# Patient Record
Sex: MALE | Race: WHITE | NOT HISPANIC OR LATINO | URBAN - METROPOLITAN AREA
[De-identification: names, ages, dates, MRNs, and addresses within clinical notes are randomized per-mention and may not be internally consistent; named-entity substitution may affect disease eponyms.]

---

## 2019-05-23 ENCOUNTER — EMERGENCY (EMERGENCY)
Facility: HOSPITAL | Age: 34
LOS: 1 days | Discharge: HOME | End: 2019-05-23
Attending: EMERGENCY MEDICINE
Payer: COMMERCIAL

## 2019-05-23 VITALS
HEIGHT: 72 IN | WEIGHT: 199.96 LBS | HEART RATE: 59 BPM | TEMPERATURE: 98 F | OXYGEN SATURATION: 95 % | RESPIRATION RATE: 18 BRPM

## 2019-05-23 LAB
BASOPHILS # BLD AUTO: 0 K/UL — SIGNIFICANT CHANGE UP (ref 0–0.2)
BASOPHILS NFR BLD AUTO: 0.1 % — SIGNIFICANT CHANGE UP (ref 0–2)
EOSINOPHIL # BLD AUTO: 0 K/UL — SIGNIFICANT CHANGE UP (ref 0–0.5)
EOSINOPHIL NFR BLD AUTO: 0.4 % — SIGNIFICANT CHANGE UP (ref 0–5)
HCT VFR BLD CALC: 44.3 % — SIGNIFICANT CHANGE UP (ref 42–52)
HGB BLD-MCNC: 14.8 G/DL — SIGNIFICANT CHANGE UP (ref 14–18)
LYMPHOCYTES # BLD AUTO: 0.8 K/UL — LOW (ref 1–4.8)
LYMPHOCYTES # BLD AUTO: 11.8 % — LOW (ref 20–55)
MCHC RBC-ENTMCNC: 27.7 PG — SIGNIFICANT CHANGE UP (ref 27–31)
MCHC RBC-ENTMCNC: 33.4 G/DL — SIGNIFICANT CHANGE UP (ref 32–36)
MCV RBC AUTO: 82.8 FL — SIGNIFICANT CHANGE UP (ref 80–94)
MONOCYTES # BLD AUTO: 0.1 K/UL — SIGNIFICANT CHANGE UP (ref 0–0.8)
MONOCYTES NFR BLD AUTO: 2 % — LOW (ref 3–10)
NEUTROPHILS # BLD AUTO: 5.9 K/UL — SIGNIFICANT CHANGE UP (ref 1.8–8)
NEUTROPHILS NFR BLD AUTO: 85.4 % — HIGH (ref 37–73)
PLATELET # BLD AUTO: 218 K/UL — SIGNIFICANT CHANGE UP (ref 150–400)
RBC # BLD: 5.35 M/UL — SIGNIFICANT CHANGE UP (ref 4.6–6.2)
RBC # FLD: 13.3 % — SIGNIFICANT CHANGE UP (ref 11–15.6)
WBC # BLD: 6.9 K/UL — SIGNIFICANT CHANGE UP (ref 4.8–10.8)
WBC # FLD AUTO: 6.9 K/UL — SIGNIFICANT CHANGE UP (ref 4.8–10.8)

## 2019-05-23 PROCEDURE — 99284 EMERGENCY DEPT VISIT MOD MDM: CPT

## 2019-05-23 NOTE — ED STATDOCS - OBJECTIVE STATEMENT
32 y/o M pt presents to ED c/o throat pain since Tuesday. Presented to urgent care today c/o throat pain and difficulty breathing x 2 days; XR of throat showed epiglottitis; given 10 ml Decadron and told to present to ED. Still reports painful swallowing, but is able to tolerate secretions. Also has mild difficulty breathing and +change in voice. No fever. No further complaints at this time. 32 y/o M pt presents to ED c/o throat pain since Tuesday. Presented to urgent care today c/o throat pain and difficulty breathing x 2 days; XR of throat showed epiglottitis; given 10 ml Decadron and told to present to ED. Still reports painful swallowing, but is able to tolerate secretions. Also has mild difficulty breathing and +change in voice. No fever. Immunizations are UTD. No further complaints at this time.

## 2019-05-23 NOTE — ED ADULT TRIAGE NOTE - CHIEF COMPLAINT QUOTE
"Dr Esteban sent me here for epiglotittis" c/o swelling to throat starting Tuesday . +difficulty breathing +painful swallowing. Tolerating secretions, speech is clear. RR even and unlabored. Appears in no apparent distress @ this time

## 2019-05-23 NOTE — ED STATDOCS - RESPIRATORY, MLM
breath sounds clear and equal bilaterally. breath sounds clear and equal bilaterally. No respiratory distress.

## 2019-05-23 NOTE — ED ADULT NURSE NOTE - OBJECTIVE STATEMENT
pt received A+Ox4, no apparent distress noted. pt c/o throat pain. was sent to ED from primary MD yi. "he said I have epiglottitis." pt states "I have this same pain at this time every year." pt c/o diff breathing and diff swallowing. no stridor noted, pt breathing even and unlabored at this time and in no apparent resp distress. pt ambulated well to bathroom with steady gait. pt educated on POC, verbalized understanding. pt safety measures maintained.

## 2019-05-23 NOTE — ED STATDOCS - CLINICAL SUMMARY MEDICAL DECISION MAKING FREE TEXT BOX
Coming from Urgent Care with hoarseness and diagnosis of epiglottitis. No stridor or drooling and patient is tolerating PO. Already given steroids. Will dc home on PO steroids. Coming from Urgent Care with hoarseness and diagnosis of epiglottitis. Patient is not anxious appearing with no respiratory distress. No stridor or drooling and patient is tolerating PO. Already given steroids. Will dc home on PO steroids.

## 2019-05-23 NOTE — ED STATDOCS - ENMT, MLM
Mouth with normal mucosa  Throat has no vesicles, no oropharyngeal exudates and uvula is midline. No drooling and tolerating mucus secretions, no stridor

## 2019-05-23 NOTE — ED STATDOCS - PROGRESS NOTE DETAILS
Rechecked O2 sat; was 98-99% on room air. 34 y/o M pt presents to ED c/o throat pain since Tuesday. Presented to urgent care today c/o throat pain and difficulty breathing x 2 days; XR of throat showed epiglottitis; given 10 ml Decadron and told to present to ED. Still reports painful swallowing, but is able to tolerate secretions. Also has mild difficulty breathing and +change in voice. No fever. Immunizations are UTD. On exam, patient does not have respiratory distress, no stridor or drooling and is tolerating. Will check labs and send to main to confirm diagnosis and for airway management if needed.

## 2019-05-24 VITALS
TEMPERATURE: 98 F | RESPIRATION RATE: 17 BRPM | HEART RATE: 60 BPM | DIASTOLIC BLOOD PRESSURE: 72 MMHG | SYSTOLIC BLOOD PRESSURE: 133 MMHG | OXYGEN SATURATION: 96 %

## 2019-05-24 LAB
ALBUMIN SERPL ELPH-MCNC: 4.8 G/DL — SIGNIFICANT CHANGE UP (ref 3.3–5.2)
ALP SERPL-CCNC: 64 U/L — SIGNIFICANT CHANGE UP (ref 40–120)
ALT FLD-CCNC: 45 U/L — HIGH
ANION GAP SERPL CALC-SCNC: 11 MMOL/L — SIGNIFICANT CHANGE UP (ref 5–17)
AST SERPL-CCNC: 66 U/L — HIGH
BILIRUB SERPL-MCNC: 0.2 MG/DL — LOW (ref 0.4–2)
BUN SERPL-MCNC: 24 MG/DL — HIGH (ref 8–20)
CALCIUM SERPL-MCNC: 9.8 MG/DL — SIGNIFICANT CHANGE UP (ref 8.6–10.2)
CHLORIDE SERPL-SCNC: 101 MMOL/L — SIGNIFICANT CHANGE UP (ref 98–107)
CO2 SERPL-SCNC: 26 MMOL/L — SIGNIFICANT CHANGE UP (ref 22–29)
CREAT SERPL-MCNC: 1.22 MG/DL — SIGNIFICANT CHANGE UP (ref 0.5–1.3)
GLUCOSE SERPL-MCNC: 107 MG/DL — SIGNIFICANT CHANGE UP (ref 70–115)
POTASSIUM SERPL-MCNC: 4.7 MMOL/L — SIGNIFICANT CHANGE UP (ref 3.5–5.3)
POTASSIUM SERPL-SCNC: 4.7 MMOL/L — SIGNIFICANT CHANGE UP (ref 3.5–5.3)
PROT SERPL-MCNC: 8.3 G/DL — SIGNIFICANT CHANGE UP (ref 6.6–8.7)
SODIUM SERPL-SCNC: 138 MMOL/L — SIGNIFICANT CHANGE UP (ref 135–145)

## 2019-05-24 PROCEDURE — 70491 CT SOFT TISSUE NECK W/DYE: CPT | Mod: 26

## 2019-05-24 PROCEDURE — 96374 THER/PROPH/DIAG INJ IV PUSH: CPT

## 2019-05-24 PROCEDURE — 80053 COMPREHEN METABOLIC PANEL: CPT

## 2019-05-24 PROCEDURE — 99284 EMERGENCY DEPT VISIT MOD MDM: CPT | Mod: 25

## 2019-05-24 PROCEDURE — 70491 CT SOFT TISSUE NECK W/DYE: CPT

## 2019-05-24 PROCEDURE — 85027 COMPLETE CBC AUTOMATED: CPT

## 2019-05-24 PROCEDURE — 36415 COLL VENOUS BLD VENIPUNCTURE: CPT

## 2019-05-24 RX ORDER — CEFTRIAXONE 500 MG/1
1 INJECTION, POWDER, FOR SOLUTION INTRAMUSCULAR; INTRAVENOUS ONCE
Refills: 0 | Status: COMPLETED | OUTPATIENT
Start: 2019-05-24 | End: 2019-05-24

## 2019-05-24 RX ORDER — AZITHROMYCIN 500 MG/1
1 TABLET, FILM COATED ORAL
Qty: 1 | Refills: 0
Start: 2019-05-24 | End: 2019-05-28

## 2019-05-24 RX ADMIN — CEFTRIAXONE 100 GRAM(S): 500 INJECTION, POWDER, FOR SOLUTION INTRAMUSCULAR; INTRAVENOUS at 00:59

## 2019-05-24 NOTE — ED PROVIDER NOTE - OBJECTIVE STATEMENT
34 y/o M pt no pmhx presents to ED c/o throat pain since Tuesday. Presented to urgent care today c/o throat pain and difficulty breathing/nasal congestion x 2 days. Pt had XR of throat at  which showed epiglottitis; given 10 ml Decadron and told to present to ED. Pt still reports mild pain with swallowing, but is able to tolerate secretions. Pt is able to tolerate PO intake. No fever, afebrile in ED. Immunizations are UTD. No further complaints. Denies fever, chills, drooling, neck swelling, sick contacts, SOB, CP.

## 2019-05-24 NOTE — ED PROVIDER NOTE - CLINICAL SUMMARY MEDICAL DECISION MAKING FREE TEXT BOX
Pt dx with epiglottitis at . Pt in no distress, comfortable a this time, non-toxic appearing, tolerating PO intake, tolerating secretions, no drooling or stridor, NAD, afebrile in ED. Will check labs and obtain CT neck to eval for epiglottitis. IV rocephin prophylactically. Will re-assess.

## 2019-05-24 NOTE — ED PROVIDER NOTE - ENMT, MLM
Airway patent, Nasal mucosa clear. Mouth with normal mucosa. Throat has no vesicles, no oropharyngeal exudates and uvula is midline. Pt tolerating secretions, no drooling, no stridor.

## 2019-05-24 NOTE — ED PROVIDER NOTE - ATTENDING CONTRIBUTION TO CARE
33y old seen and evalauted at a urgent care for epiglottis, sent to ed fr further evalaution, no fever, no drooling in ed, plan to get a ct, labs, fluids, I personally saw the patient with the PA, and completed the key components of the history and physical exam. I then discussed the management plan with the PA.

## 2019-05-24 NOTE — ED PROVIDER NOTE - NS ED ROS FT
Gen: denies weakness, malaise/fatigue, fever, chills  Skin: denies rashes, hives  HEENT: +Throat pain. Denies drooling, headaches, LOC, visual changes  Respiratory: denies stridor, cough, dyspnea, STEWART, SOB, wheezing  Cardiovascular: denies chest pain, palpitations, diaphoresis, edema  GI: denies abdominal pain, nausea/vomiting, diarrhea/constipation  MSK: denies limitation on movement, weakness, joint swelling/redness/warmth  Neuro: denies LOC, convulsions/seizures, syncope, headache, dizziness, vertigo, numbness/tingling

## 2019-05-24 NOTE — ED PROVIDER NOTE - RESPIRATORY, MLM
Speaking in complete sentences. No resp distress or accessory muscle use. Breath sounds clear and equal bilaterally. No wheezes, rales, rhonchi

## 2019-05-24 NOTE — ED PROVIDER NOTE - CONSTITUTIONAL, MLM
normal... Well appearing, well nourished, awake, alert, oriented to person, place, time/situation and in no apparent distress. Pt resting comfortably.

## 2019-06-11 PROBLEM — Z00.00 ENCOUNTER FOR PREVENTIVE HEALTH EXAMINATION: Status: ACTIVE | Noted: 2019-06-11

## 2019-06-11 PROBLEM — Z78.9 OTHER SPECIFIED HEALTH STATUS: Chronic | Status: ACTIVE | Noted: 2019-05-24

## 2019-07-03 ENCOUNTER — APPOINTMENT (OUTPATIENT)
Dept: DERMATOLOGY | Facility: CLINIC | Age: 34
End: 2019-07-03

## 2019-07-10 ENCOUNTER — APPOINTMENT (OUTPATIENT)
Dept: DERMATOLOGY | Facility: CLINIC | Age: 34
End: 2019-07-10

## 2023-08-03 ENCOUNTER — NON-APPOINTMENT (OUTPATIENT)
Age: 38
End: 2023-08-03

## 2023-08-03 ENCOUNTER — APPOINTMENT (OUTPATIENT)
Dept: GASTROENTEROLOGY | Facility: CLINIC | Age: 38
End: 2023-08-03
Payer: COMMERCIAL

## 2023-08-03 VITALS
BODY MASS INDEX: 35.49 KG/M2 | OXYGEN SATURATION: 98 % | SYSTOLIC BLOOD PRESSURE: 130 MMHG | DIASTOLIC BLOOD PRESSURE: 80 MMHG | TEMPERATURE: 98.3 F | WEIGHT: 262 LBS | HEIGHT: 72 IN | RESPIRATION RATE: 17 BRPM | HEART RATE: 66 BPM

## 2023-08-03 DIAGNOSIS — E83.110 HEREDITARY HEMOCHROMATOSIS: ICD-10-CM

## 2023-08-03 DIAGNOSIS — Z02.89 ENCOUNTER FOR OTHER ADMINISTRATIVE EXAMINATIONS: ICD-10-CM

## 2023-08-03 DIAGNOSIS — Z78.9 OTHER SPECIFIED HEALTH STATUS: ICD-10-CM

## 2023-08-03 PROCEDURE — 99205 OFFICE O/P NEW HI 60 MIN: CPT

## 2023-08-03 NOTE — ASSESSMENT
[FreeTextEntry1] : Patient with heterozygosity for her due to hemochromatosis elevated LFTs elevated ferritin. I could not find transferrin saturation in the scanned records.  Ordered.  Also ordered labs to rule out other etiologies of liver disease. Patient states that MRI for iron quantification was already ordered by his PCP. I have asked him to bring in MRI and follow-up after above lab tests are performed.

## 2023-08-03 NOTE — PHYSICAL EXAM
[General Appearance - Alert] : alert [General Appearance - In No Acute Distress] : in no acute distress [Sclera] : the sclera and conjunctiva were normal [PERRL With Normal Accommodation] : pupils were equal in size, round, and reactive to light [Extraocular Movements] : extraocular movements were intact [Bowel Sounds] : normal bowel sounds [Abdomen Soft] : soft [Abdomen Tenderness] : non-tender [Abdomen Mass (___ Cm)] : no abdominal mass palpated [Abnormal Walk] : normal gait [Nail Clubbing] : no clubbing  or cyanosis of the fingernails [Musculoskeletal - Swelling] : no joint swelling seen [Motor Tone] : muscle strength and tone were normal [Skin Color & Pigmentation] : normal skin color and pigmentation [Skin Turgor] : normal skin turgor [] : no rash [Oriented To Time, Place, And Person] : oriented to person, place, and time [Impaired Insight] : insight and judgment were intact [Affect] : the affect was normal [Scleral Icterus] : No Scleral Icterus [Spider Angioma] : No spider angioma(s) were observed [Abdominal Bruit] : no abdominal bruit [Abdominal  Ascites] : no ascites [Jaundice] : No jaundice [Palmar Erythema] : no Palmar Erythema

## 2023-08-03 NOTE — HISTORY OF PRESENT ILLNESS
[de-identified] : A pleasant 38-year-old patient with history of heterozygosity for h63d hemochromatosis gene mutation with elevated LFTs May 2023 AST 70, ALT 64 ferritin greater than thousand- 1293 ng/ml was sent in for management by PCP Dr. Gilliam. In June 2021 ALT was 84, , alk phos 49, albumin 4.6  Other labs sent in by PCPs office are as follows Hemoglobin 16, WBC 4.4 neck, platelets 306 Glucose 70, creatinine 0.99, sodium 138, protein 7.2, albumin 4.4, bilirubin 0.3 Hepatitis A IgM negative Hepatitis B surface antigen screen negative Hepatitis B core antibody IgM negative HCV antibody nonreactive  Total cholesterol 210, triglycerides 93, HDL 32, , hemoglobin A1c 5, TSH 2.4, creatinine kinase 7, HCV antibody nonreactive  Ultrasound was done on May 15, 2023 Impression Liver: The liver is normal in size.  The liver parenchyma is homogenous.  No focal lesions are identified. Mild dilation of CBD at 0.8 cm without evidence of CBD stone or dilated intrahepatic bile ducts   No significant history of alcohol intake. Type 2 diabetes mellitus in his mother
